# Patient Record
Sex: MALE | Race: OTHER | NOT HISPANIC OR LATINO | URBAN - METROPOLITAN AREA
[De-identification: names, ages, dates, MRNs, and addresses within clinical notes are randomized per-mention and may not be internally consistent; named-entity substitution may affect disease eponyms.]

---

## 2017-07-12 ENCOUNTER — EMERGENCY (EMERGENCY)
Facility: HOSPITAL | Age: 32
LOS: 1 days | Discharge: HOME | End: 2017-07-12
Attending: EMERGENCY MEDICINE | Admitting: EMERGENCY MEDICINE
Payer: COMMERCIAL

## 2017-07-12 VITALS
OXYGEN SATURATION: 99 % | SYSTOLIC BLOOD PRESSURE: 116 MMHG | DIASTOLIC BLOOD PRESSURE: 72 MMHG | RESPIRATION RATE: 16 BRPM | HEART RATE: 73 BPM | TEMPERATURE: 98 F

## 2017-07-12 PROCEDURE — 99284 EMERGENCY DEPT VISIT MOD MDM: CPT

## 2017-07-12 RX ORDER — CIPROFLOXACIN LACTATE 400MG/40ML
750 VIAL (ML) INTRAVENOUS ONCE
Qty: 0 | Refills: 0 | Status: COMPLETED | OUTPATIENT
Start: 2017-07-12 | End: 2017-07-12

## 2017-07-12 RX ORDER — ONDANSETRON 8 MG/1
8 TABLET, FILM COATED ORAL ONCE
Qty: 0 | Refills: 0 | Status: COMPLETED | OUTPATIENT
Start: 2017-07-12 | End: 2017-07-12

## 2017-07-12 RX ORDER — LOPERAMIDE HCL 2 MG
4 TABLET ORAL ONCE
Qty: 0 | Refills: 0 | Status: COMPLETED | OUTPATIENT
Start: 2017-07-12 | End: 2017-07-12

## 2017-07-12 RX ORDER — ONDANSETRON 8 MG/1
1 TABLET, FILM COATED ORAL
Qty: 12 | Refills: 0 | OUTPATIENT
Start: 2017-07-12 | End: 2017-07-15

## 2017-07-12 RX ADMIN — Medication 4 MILLIGRAM(S): at 21:53

## 2017-07-12 RX ADMIN — ONDANSETRON 8 MILLIGRAM(S): 8 TABLET, FILM COATED ORAL at 21:53

## 2017-07-12 RX ADMIN — Medication 750 MILLIGRAM(S): at 21:58

## 2017-07-12 NOTE — ED PROVIDER NOTE - OBJECTIVE STATEMENT
32 y/o M w/ no significant PMHx comes in w/ x4days of watery diarrhea around x8 episodes/day. Endorses nausea, decreased PO intake, intermittent abdominal cramping pain. Denies fever, blood in stool, sick contacts, recent travel.

## 2017-07-12 NOTE — ED PROVIDER NOTE - MEDICAL DECISION MAKING DETAILS
Likely gastroenteritis, pt declined IV and blood work. Plan - will give PO Zofran, loperamide, x1 dose Cipro.

## 2017-07-12 NOTE — ED ADULT TRIAGE NOTE - CHIEF COMPLAINT QUOTE
pt. c/o diarrhea , nausea and intermittent abdominal cramping since Sunday. Pt. denies any PMH , appears comfortable in triage.

## 2021-08-10 ENCOUNTER — APPOINTMENT (OUTPATIENT)
Dept: INTERNAL MEDICINE | Facility: CLINIC | Age: 36
End: 2021-08-10
Payer: MEDICAID

## 2021-08-10 VITALS
HEART RATE: 78 BPM | SYSTOLIC BLOOD PRESSURE: 122 MMHG | OXYGEN SATURATION: 98 % | HEIGHT: 65 IN | WEIGHT: 218 LBS | TEMPERATURE: 96.9 F | RESPIRATION RATE: 16 BRPM | BODY MASS INDEX: 36.32 KG/M2 | DIASTOLIC BLOOD PRESSURE: 82 MMHG

## 2021-08-10 DIAGNOSIS — Z11.52 ENCOUNTER FOR SCREENING FOR COVID-19: ICD-10-CM

## 2021-08-10 PROBLEM — Z00.00 ENCOUNTER FOR PREVENTIVE HEALTH EXAMINATION: Status: ACTIVE | Noted: 2021-08-10

## 2021-08-10 PROCEDURE — 99212 OFFICE O/P EST SF 10 MIN: CPT

## 2021-08-17 LAB — SARS-COV-2 N GENE NPH QL NAA+PROBE: NOT DETECTED

## 2021-11-02 ENCOUNTER — APPOINTMENT (OUTPATIENT)
Dept: PHYSICAL MEDICINE AND REHAB | Facility: CLINIC | Age: 36
End: 2021-11-02
Payer: MEDICAID

## 2021-11-02 VITALS
HEART RATE: 76 BPM | HEIGHT: 65 IN | RESPIRATION RATE: 18 BRPM | BODY MASS INDEX: 36.32 KG/M2 | DIASTOLIC BLOOD PRESSURE: 72 MMHG | WEIGHT: 218 LBS | TEMPERATURE: 96.8 F | OXYGEN SATURATION: 98 % | SYSTOLIC BLOOD PRESSURE: 114 MMHG

## 2021-11-02 DIAGNOSIS — F17.200 NICOTINE DEPENDENCE, UNSPECIFIED, UNCOMPLICATED: ICD-10-CM

## 2021-11-02 DIAGNOSIS — R42 DIZZINESS AND GIDDINESS: ICD-10-CM

## 2021-11-02 PROCEDURE — 99204 OFFICE O/P NEW MOD 45 MIN: CPT

## 2021-11-02 RX ORDER — METHYLPREDNISOLONE 4 MG/1
4 TABLET ORAL
Qty: 1 | Refills: 0 | Status: ACTIVE | COMMUNITY
Start: 2021-11-02 | End: 1900-01-01

## 2021-11-04 NOTE — HISTORY OF PRESENT ILLNESS
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  He was attempting to control the roll of the spool when he felt a pain in his low back.  \par To the ER at [facility].  X-rays were taken and reported negative.\par Was prescribed ibuprofen 600 mg to be taken 3 times a day.  This provides minimal relief\par He was prescribed cyclobenzaprine 10 mg 1 p.o. 3 times daily.  This only makes him tired without pain relief\par \par Low back pain\par Pain:  6/10 Worse: 10/10 Quality: knifleike  Frequency: constant\par The pain starts in right side of the low back.  The pain radiates into the right buttock.\par It is aggravated with standing over the right leg; bending forward; twisting the trunk in either direction.\par He has difficulty with lifting his right leg.\par \par Denies bowel bladder difficulties.  No leg weakness.\par \par \par No previous WC injuries\par He has no previous injuries to the low back\par \par Functional deficits.  He has difficulty with lower extremity ADLs due to pain in his low back he cannot bend forward.,\par He is unable to sleep well at night because he is unable to find a comfortable position.\par He needs to change positions frequently.\par He has pain with use of bathroom\par He took a taxi to the office\par he has been off work since Sat 10/30/2021\par

## 2021-11-04 NOTE — REVIEW OF SYSTEMS
[Joint Pain] : joint pain [Muscle Pain] : muscle pain [Fever] : no fever [Eye Pain] : no eye pain [Earache] : no earache [Chest Pain] : no chest pain [Wheezing] : no wheezing [Nausea] : no nausea [Skin Wound] : no skin wound [Dizziness] : no dizziness [Insomnia] : no insomnia [Easy Bruising] : no tendency for easy bruising

## 2021-11-04 NOTE — PHYSICAL EXAM
[FreeTextEntry1] : Pleasant, in no distress. Language: English\par HEENT: Head: no trauma. Eyes: no discharge. Ears: No discharge. Nose No discharge. Throat: clear\par Neck: FAROM. Negative Spurlings\par Heart: RR, +S1, S2\par Lungs: CTA\par Abdomen: soft, NT\par Lumbar spine: moderate spasm on the bilateral  low back.  worse with turning and and twisting the back.  Flexion 0 to 30 degrees.\par \par LUE: Shoulder:FAROM, MS 5/5\par Elbow: FAROM, MS 5/5 reflexes 2/4\par Wrist: FAROM, MS 5/5 reflexes 2/4\par Warm, nontender, pulse 2+\par \par RUE:Shoulder:FAROM, MS 5/5\par Elbow: FAROM, MS 5/5 reflexes 2/4\par Wrist: FAROM, MS 5/5 reflexes 2/4\par Warm, nontender, pulse 2+\par \par LLE: Hip: FAROM, MS 4/5\par Knee: FAROM, MS 4/5 reflexes 2/4\par Ankle: FAROM, MS 4/5 reflexes 2/4\par Warm , nontender, pulse 2+ negative homans\par \par RLE: Hip: FAROM, MS 4/5\par Knee: FAROM, MS 4/5 reflexes 2/4\par Ankle: FAROM, MS 4/5 reflexes 2/4\par Warm , nontender, pulse 2+ negative homans\par \par Gait: Spontaneous, reciprocal, slow due to low back pain without an assistive device\par Bending forward is increases the low back pain. \par \par Sensation\par RUE: sensation is intact to light touch, pinprick  and proprioception\par LUE: sensation is intact to light touch, pinprick  and proprioception\par RLE: sensation is intact to light touch, pinprick  and proprioception. Pos SLR. Neg MYRNA, Neg FADIR\par LLE: sensation is intact to light touch, pinprick  and proprioception. Pos  SLR. Neg MYRNA, Neg FADIR\par \par

## 2021-11-15 ENCOUNTER — APPOINTMENT (OUTPATIENT)
Dept: PHYSICAL MEDICINE AND REHAB | Facility: CLINIC | Age: 36
End: 2021-11-15
Payer: MEDICAID

## 2021-11-15 VITALS
TEMPERATURE: 96.3 F | BODY MASS INDEX: 36.65 KG/M2 | OXYGEN SATURATION: 99 % | SYSTOLIC BLOOD PRESSURE: 122 MMHG | WEIGHT: 220 LBS | DIASTOLIC BLOOD PRESSURE: 70 MMHG | HEART RATE: 77 BPM | HEIGHT: 65 IN | RESPIRATION RATE: 18 BRPM

## 2021-11-15 PROCEDURE — 99213 OFFICE O/P EST LOW 20 MIN: CPT

## 2021-11-16 NOTE — REVIEW OF SYSTEMS
[Joint Pain] : joint pain [Muscle Pain] : muscle pain [Fever] : no fever [Eye Pain] : no eye pain [Earache] : no earache [Chest Pain] : no chest pain [Nausea] : no nausea [Wheezing] : no wheezing [Skin Wound] : no skin wound [Dizziness] : no dizziness [Insomnia] : no insomnia [Easy Bruising] : no tendency for easy bruising

## 2021-11-16 NOTE — PHYSICAL EXAM
[FreeTextEntry1] : Pleasant, in no distress. Language: English\par HEENT: Head: no trauma. Eyes: no discharge. Ears: No discharge. Nose No discharge. Throat: clear\par Neck: FAROM. Negative Spurlings\par Heart: RR, +S1, S2\par Lungs: CTA\par Abdomen: soft, NT\par Lumbar spine: moderate spasm on the bilateral  low back.  Tender over the left sacroiliac joint flexion 0 to 60 degrees degrees.\par \par LUE: Shoulder:FAROM, MS 5/5\par Elbow: FAROM, MS 5/5 reflexes 2/4\par Wrist: FAROM, MS 5/5 reflexes 2/4\par Warm, nontender, pulse 2+\par \par RUE:Shoulder:FAROM, MS 5/5\par Elbow: FAROM, MS 5/5 reflexes 2/4\par Wrist: FAROM, MS 5/5 reflexes 2/4\par Warm, nontender, pulse 2+\par \par LLE: Hip: FAROM, MS 4/5\par Knee: FAROM, MS 4/5 reflexes 2/4\par Ankle: FAROM, MS 4/5 reflexes 2/4\par Warm , nontender, pulse 2+ negative homans\par \par RLE: Hip: FAROM, MS 4/5\par Knee: FAROM, MS 4/5 reflexes 2/4\par Ankle: FAROM, MS 4/5 reflexes 2/4\par Warm , nontender, pulse 2+ negative homans\par \par Gait: Spontaneous, reciprocal, slow due to low back pain without an assistive device\par Bending forward is increases the low back pain. \par \par Sensation\par RUE: sensation is intact to light touch, pinprick  and proprioception\par LUE: sensation is intact to light touch, pinprick  and proprioception\par RLE: sensation is intact to light touch, pinprick  and proprioception. Pos SLR. Neg MYRNA, Neg FADIR\par LLE: sensation is intact to light touch, pinprick  and proprioception. Pos  SLR. Neg MYRNA, Neg FADIR\par \par

## 2021-11-16 NOTE — HISTORY OF PRESENT ILLNESS
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  He was attempting to control the roll of the spool when he felt a pain in his low back.  \par To the ER at [facility].  X-rays were taken and reported negative.\par Was prescribed ibuprofen 600 mg to be taken 3 times a day.  This provides minimal relief\par He was prescribed cyclobenzaprine 10 mg 1 p.o. 3 times daily.  This only makes him tired without pain relief\par He states that he there was confusion.  2 weeks ago he did not receive his prescription for physical therapy and a follow-up appointment for today's visit.\par He completed the course of Medrol dose pack.  His pain is significantly better.  He is able to move around the house.\par \par Low back pain\par Pain:  4/10 Worse: 10/10 Quality: knifleike  Frequency: constant\par The pain starts in right side of the low back.  The pain radiates into the right buttock.\par It is aggravated with standing over the right leg; bending forward; twisting the trunk in either direction.\par He has difficulty with lifting his right leg.\par \par Denies bowel bladder difficulties.  No leg weakness.\par \par \par No previous WC injuries\par He has no previous injuries to the low back\par \par Functional deficits.  He has difficulty with lower extremity ADLs due to pain in his low back he cannot bend forward.,\par He is unable to sleep well at night because he is unable to find a comfortable position.\par He needs to change positions frequently.\par He has pain with use of bathroom\par He took a taxi to the office\par he has been off work since Sat 10/30/2021\par

## 2021-11-24 ENCOUNTER — RX RENEWAL (OUTPATIENT)
Age: 36
End: 2021-11-24

## 2021-11-24 RX ORDER — TIZANIDINE 2 MG/1
2 TABLET ORAL
Qty: 30 | Refills: 0 | Status: ACTIVE | COMMUNITY
Start: 2021-11-02 | End: 1900-01-01

## 2021-11-24 RX ORDER — NAPROXEN 500 MG/1
500 TABLET ORAL
Qty: 60 | Refills: 0 | Status: ACTIVE | COMMUNITY
Start: 2021-11-02 | End: 1900-01-01

## 2021-11-27 PROBLEM — R42 VERTIGO: Status: ACTIVE | Noted: 2021-11-27

## 2021-11-27 PROBLEM — F17.200 CURRENT EVERY DAY SMOKER: Status: ACTIVE | Noted: 2021-11-27

## 2021-11-27 RX ORDER — MECLIZINE HYDROCHLORIDE 12.5 MG/1
12.5 TABLET ORAL
Refills: 0 | Status: ACTIVE | COMMUNITY

## 2021-11-27 RX ORDER — CETIRIZINE HYDROCHLORIDE 10 MG/1
10 TABLET, CHEWABLE ORAL
Refills: 0 | Status: ACTIVE | COMMUNITY

## 2021-11-27 RX ORDER — FLUTICASONE PROPIONATE 50 UG/1
50 SPRAY, METERED NASAL
Refills: 0 | Status: ACTIVE | COMMUNITY

## 2021-11-29 ENCOUNTER — APPOINTMENT (OUTPATIENT)
Dept: PHYSICAL MEDICINE AND REHAB | Facility: CLINIC | Age: 36
End: 2021-11-29
Payer: MEDICAID

## 2021-11-29 VITALS
HEIGHT: 65 IN | TEMPERATURE: 98.6 F | RESPIRATION RATE: 18 BRPM | DIASTOLIC BLOOD PRESSURE: 80 MMHG | OXYGEN SATURATION: 99 % | BODY MASS INDEX: 36.49 KG/M2 | WEIGHT: 219 LBS | SYSTOLIC BLOOD PRESSURE: 120 MMHG | HEART RATE: 79 BPM

## 2021-11-29 DIAGNOSIS — Z76.89 PERSONS ENCOUNTERING HEALTH SERVICES IN OTHER SPECIFIED CIRCUMSTANCES: ICD-10-CM

## 2021-11-29 PROCEDURE — 99213 OFFICE O/P EST LOW 20 MIN: CPT

## 2021-11-30 PROBLEM — Z76.89 RETURN TO WORK EVALUATION: Status: ACTIVE | Noted: 2021-11-02

## 2021-11-30 NOTE — PHYSICAL EXAM
[FreeTextEntry1] : Pleasant, in no distress. Language: English\par HEENT: Head: no trauma. Eyes: no discharge. Ears: No discharge. Nose No discharge. Throat: clear\par Neck: FAROM. Negative Spurlings\par Heart: RR, +S1, S2\par Lungs: CTA\par Abdomen: soft, NT\par Lumbar spine: Mild spasm on the bilateral  low back.  \par \par LUE: Shoulder:FAROM, MS 5/5\par Elbow: FAROM, MS 5/5 reflexes 2/4\par Wrist: FAROM, MS 5/5 reflexes 2/4\par Warm, nontender, pulse 2+\par \par RUE:Shoulder:FAROM, MS 5/5\par Elbow: FAROM, MS 5/5 reflexes 2/4\par Wrist: FAROM, MS 5/5 reflexes 2/4\par Warm, nontender, pulse 2+\par \par LLE: Hip: FAROM, MS 5/5\par Knee: FAROM, MS 5/5 reflexes 2/4\par Ankle: FAROM, MS 5/5 reflexes 2/4\par Warm , nontender, pulse 2+ negative homans\par \par RLE: Hip: FAROM, MS 5/5\par Knee: FAROM, MS 5/5 reflexes 2/4\par Ankle: FAROM, MS 5/5 reflexes 2/4\par Warm , nontender, pulse 2+ negative homans\par \par Gait: Spontaneous, reciprocal, slow due to low back pain without an assistive device\par Patient is able to bend forward extend his back with no complaint of pain.  He performs a squat with no pain.\par \par Sensation\par RUE: sensation is intact to light touch, pinprick  and proprioception\par LUE: sensation is intact to light touch, pinprick  and proprioception\par RLE: sensation is intact to light touch, pinprick  and proprioception. Pos SLR. Neg MYRNA, Neg FADIR\par LLE: sensation is intact to light touch, pinprick  and proprioception. Pos  SLR. Neg MYRNA, Neg FADIR\par \par

## 2021-11-30 NOTE — HISTORY OF PRESENT ILLNESS
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  He was attempting to control the roll of the spool when he felt a pain in his low back.  \par To the ER at [facility].  X-rays were taken and reported negative.\par Was prescribed ibuprofen 600 mg to be taken 3 times a day.  This provides minimal relief\par He was prescribed cyclobenzaprine 10 mg 1 p.o. 3 times daily.  This only makes him tired without pain relief\par \par His pain was significantly better after the completion of the use of Medrol Dosepak at the last office visit 2 weeks ago\par \par He states that he was unable to attend restorative physical therapy because the therapy center was busy and did not have an appointment until tomorrow.\par \par He has been performing a home exercise program.\par \par His job did not have a light duty position for him\par \par Low back pain\par Pain:  0/10 Worse: 10/10 Quality: knifleike  Frequency: constant\par He has mild soreness in his low back at the end of the day.\par He has less pain with bending and twisting.\par He is able to bend forward and ascend stairs with no pain\par \par Denies bowel bladder difficulties.  No leg weakness.\par \par \par No previous WC injuries\par He has no previous injuries to the low back\par \par Functional deficits.  He has difficulty with lower extremity ADLs due to pain in his low back he cannot bend forward.,\par He is unable to sleep well at night because he is unable to find a comfortable position.\par He needs to change positions frequently.\par He has pain with use of bathroom\par He took a taxi to the office\par he has been off work since Sat 10/30/2021\par

## 2022-06-14 ENCOUNTER — APPOINTMENT (OUTPATIENT)
Dept: INTERNAL MEDICINE | Facility: CLINIC | Age: 37
End: 2022-06-14
Payer: COMMERCIAL

## 2022-06-14 VITALS
HEART RATE: 83 BPM | RESPIRATION RATE: 18 BRPM | DIASTOLIC BLOOD PRESSURE: 80 MMHG | HEIGHT: 65 IN | BODY MASS INDEX: 35.16 KG/M2 | WEIGHT: 211 LBS | TEMPERATURE: 98.2 F | SYSTOLIC BLOOD PRESSURE: 110 MMHG | OXYGEN SATURATION: 98 %

## 2022-06-14 DIAGNOSIS — M54.41 LUMBAGO WITH SCIATICA, RIGHT SIDE: ICD-10-CM

## 2022-06-14 DIAGNOSIS — J32.9 CHRONIC SINUSITIS, UNSPECIFIED: ICD-10-CM

## 2022-06-14 PROCEDURE — 99214 OFFICE O/P EST MOD 30 MIN: CPT

## 2022-06-14 RX ORDER — AZITHROMYCIN 250 MG/1
250 TABLET, FILM COATED ORAL
Qty: 1 | Refills: 0 | Status: ACTIVE | COMMUNITY
Start: 2022-06-14 | End: 1900-01-01

## 2022-06-14 NOTE — HISTORY OF PRESENT ILLNESS
[Cough] : cough [Sore Throat] : sore throat [___ Days ago] : [unfilled] days ago [Congestion] : congestion [Chills] : chills [Fatigue] : fatigue [Headache] : headache [Fever] : fever [Shortness Of Breath] : no shortness of breath [FreeTextEntry8] : He is also complaining of back pain, is been going on for a while, worse with local coughing.

## 2022-06-14 NOTE — PHYSICAL EXAM
[No Acute Distress] : no acute distress [Well Nourished] : well nourished [Well Developed] : well developed [Well-Appearing] : well-appearing [Normal Sclera/Conjunctiva] : normal sclera/conjunctiva [PERRL] : pupils equal round and reactive to light [EOMI] : extraocular movements intact [Normal Outer Ear/Nose] : the outer ears and nose were normal in appearance [Normal Oropharynx] : the oropharynx was normal [No Lymphadenopathy] : no lymphadenopathy [Supple] : supple [Thyroid Normal, No Nodules] : the thyroid was normal and there were no nodules present [No Respiratory Distress] : no respiratory distress  [No Accessory Muscle Use] : no accessory muscle use [Clear to Auscultation] : lungs were clear to auscultation bilaterally [Normal Rate] : normal rate  [Regular Rhythm] : with a regular rhythm [Normal S1, S2] : normal S1 and S2 [No Murmur] : no murmur heard [No Edema] : there was no peripheral edema [Soft] : abdomen soft [Non Tender] : non-tender [Non-distended] : non-distended [No Masses] : no abdominal mass palpated [No HSM] : no HSM [Normal Bowel Sounds] : normal bowel sounds [Normal Posterior Cervical Nodes] : no posterior cervical lymphadenopathy [Normal Anterior Cervical Nodes] : no anterior cervical lymphadenopathy [No CVA Tenderness] : no CVA  tenderness [No Spinal Tenderness] : no spinal tenderness [No Joint Swelling] : no joint swelling [Grossly Normal Strength/Tone] : grossly normal strength/tone [No Rash] : no rash [Coordination Grossly Intact] : coordination grossly intact [No Focal Deficits] : no focal deficits [Normal Gait] : normal gait [Deep Tendon Reflexes (DTR)] : deep tendon reflexes were 2+ and symmetric [Normal Affect] : the affect was normal [Normal Insight/Judgement] : insight and judgment were intact [de-identified] : Point tenderness bilaterally in lumbar area negative straight leg raise

## 2022-06-17 ENCOUNTER — NON-APPOINTMENT (OUTPATIENT)
Age: 37
End: 2022-06-17

## 2022-06-17 LAB — SARS-COV-2 N GENE NPH QL NAA+PROBE: NOT DETECTED

## 2022-10-24 ENCOUNTER — APPOINTMENT (OUTPATIENT)
Dept: INTERNAL MEDICINE | Facility: CLINIC | Age: 37
End: 2022-10-24

## 2022-10-24 DIAGNOSIS — U07.1 COVID-19: ICD-10-CM

## 2022-10-24 DIAGNOSIS — R05.9 COUGH, UNSPECIFIED: ICD-10-CM

## 2022-10-24 PROCEDURE — 99072 ADDL SUPL MATRL&STAF TM PHE: CPT

## 2022-10-24 PROCEDURE — 99213 OFFICE O/P EST LOW 20 MIN: CPT

## 2022-10-26 LAB
RAPID RVP RESULT: DETECTED
RV+EV RNA SPEC QL NAA+PROBE: DETECTED
SARS-COV-2 RNA PNL RESP NAA+PROBE: DETECTED

## 2022-10-26 RX ORDER — PROMETHAZINE HYDROCHLORIDE AND DEXTROMETHORPHAN HYDROBROMIDE ORAL SOLUTION 15; 6.25 MG/5ML; MG/5ML
6.25-15 SOLUTION ORAL EVERY 6 HOURS
Qty: 200 | Refills: 0 | Status: ACTIVE | COMMUNITY
Start: 2022-10-26 | End: 1900-01-01

## 2022-10-29 PROBLEM — R05.9 COUGH: Status: ACTIVE | Noted: 2022-10-26

## 2022-10-29 PROBLEM — U07.1 COVID-19: Status: ACTIVE | Noted: 2022-10-29

## 2022-11-01 NOTE — HISTORY OF PRESENT ILLNESS
[FreeTextEntry8] : 36 y/o male presents with concerns for rapid covid + at home test. He presents to check  PCR. He feels otherwise well and reports no other acute complaints or concerns. ROS as documented below.\par

## 2022-11-01 NOTE — REVIEW OF SYSTEMS
[Nasal Discharge] : nasal discharge [Cough] : cough [Fever] : no fever [Chills] : no chills [Recent Change In Weight] : ~T no recent weight change [Vision Problems] : no vision problems [Earache] : no earache [Sore Throat] : no sore throat [Chest Pain] : no chest pain [Palpitations] : no palpitations [Shortness Of Breath] : no shortness of breath [Wheezing] : no wheezing [Abdominal Pain] : no abdominal pain [Nausea] : no nausea [Diarrhea] : no diarrhea [Vomiting] : no vomiting [Dysuria] : no dysuria [Hesitancy] : no hesitancy [Hematuria] : no hematuria [Frequency] : no frequency [Joint Pain] : no joint pain [Joint Swelling] : no joint swelling [Skin Rash] : no skin rash [Headache] : no headache [Dizziness] : no dizziness [Anxiety] : no anxiety [Depression] : no depression [Swollen Glands] : no swollen glands

## 2022-12-16 ENCOUNTER — APPOINTMENT (OUTPATIENT)
Dept: INTERNAL MEDICINE | Facility: CLINIC | Age: 37
End: 2022-12-16

## 2023-09-09 ENCOUNTER — APPOINTMENT (OUTPATIENT)
Dept: INTERNAL MEDICINE | Facility: CLINIC | Age: 38
End: 2023-09-09

## 2023-11-03 ENCOUNTER — APPOINTMENT (OUTPATIENT)
Dept: INTERNAL MEDICINE | Facility: CLINIC | Age: 38
End: 2023-11-03
Payer: COMMERCIAL

## 2023-11-03 VITALS
SYSTOLIC BLOOD PRESSURE: 122 MMHG | RESPIRATION RATE: 18 BRPM | OXYGEN SATURATION: 97 % | WEIGHT: 227 LBS | BODY MASS INDEX: 37.82 KG/M2 | TEMPERATURE: 98.6 F | HEIGHT: 65 IN | HEART RATE: 90 BPM | DIASTOLIC BLOOD PRESSURE: 84 MMHG

## 2023-11-03 DIAGNOSIS — J06.9 ACUTE UPPER RESPIRATORY INFECTION, UNSPECIFIED: ICD-10-CM

## 2023-11-03 PROCEDURE — 99213 OFFICE O/P EST LOW 20 MIN: CPT

## 2023-11-06 LAB
RAPID RVP RESULT: NOT DETECTED
SARS-COV-2 RNA PNL RESP NAA+PROBE: NOT DETECTED

## 2023-11-10 PROBLEM — J06.9 URI, ACUTE: Status: ACTIVE | Noted: 2023-11-10 | Resolved: 2023-12-10

## 2025-02-23 PROBLEM — Z13.6 SCREENING FOR OTHER AND UNSPECIFIED CARDIOVASCULAR CONDITIONS: Status: ACTIVE | Noted: 2025-02-23

## 2025-02-23 PROBLEM — Z13.31 NEGATIVE DEPRESSION SCREENING: Status: ACTIVE | Noted: 2025-02-23

## 2025-02-23 PROBLEM — G47.00 INSOMNIA: Status: ACTIVE | Noted: 2025-02-23

## 2025-02-23 PROBLEM — Z87.09 HISTORY OF SINUSITIS: Status: RESOLVED | Noted: 2022-06-14 | Resolved: 2025-02-23

## 2025-02-23 PROBLEM — U07.1 COVID-19: Status: RESOLVED | Noted: 2022-10-29 | Resolved: 2025-02-23

## 2025-02-23 PROBLEM — Z00.00 ANNUAL PHYSICAL EXAM: Status: ACTIVE | Noted: 2025-02-23

## 2025-02-23 PROBLEM — M54.41 ACUTE RIGHT-SIDED LOW BACK PAIN WITH RIGHT-SIDED SCIATICA: Status: RESOLVED | Noted: 2021-11-02 | Resolved: 2025-02-23

## 2025-02-23 PROBLEM — Z13.39 ALCOHOL SCREENING: Status: ACTIVE | Noted: 2025-02-23

## 2025-02-23 PROBLEM — Z87.898 HISTORY OF VERTIGO: Status: RESOLVED | Noted: 2021-11-27 | Resolved: 2025-02-23

## 2025-05-27 ENCOUNTER — RX RENEWAL (OUTPATIENT)
Age: 40
End: 2025-05-27

## 2025-07-05 ENCOUNTER — RX RENEWAL (OUTPATIENT)
Age: 40
End: 2025-07-05